# Patient Record
Sex: MALE | Race: ASIAN | NOT HISPANIC OR LATINO | URBAN - METROPOLITAN AREA
[De-identification: names, ages, dates, MRNs, and addresses within clinical notes are randomized per-mention and may not be internally consistent; named-entity substitution may affect disease eponyms.]

---

## 2018-10-02 PROBLEM — 74474003 GASTROINTESTINAL HEMORRHAGE: Status: ACTIVE | Noted: 2018-10-02

## 2019-01-21 PROBLEM — 80426004 PROLAPSED INTERNAL HEMORRHOIDS: Status: ACTIVE | Noted: 2019-01-21

## 2019-01-21 PROBLEM — 79922009 EPIGASTRIC PAIN: Status: ACTIVE | Noted: 2018-10-02

## 2019-01-21 PROBLEM — 266464001 HEMORRHAGE OF RECTUM AND ANUS: Status: ACTIVE | Noted: 2018-10-02

## 2021-02-01 ENCOUNTER — TELEPHONE ENCOUNTER (OUTPATIENT)
Dept: URBAN - METROPOLITAN AREA CLINIC 35 | Facility: CLINIC | Age: 41
End: 2021-02-01

## 2021-02-01 RX ORDER — LANSOPRAZOLE 15 MG/1
1 CAPSULE BEFORE A MEAL CAPSULE, DELAYED RELEASE ORAL
Qty: 90 | Refills: 4 | OUTPATIENT
Start: 2021-02-01

## 2021-10-21 ENCOUNTER — LAB OUTSIDE AN ENCOUNTER (OUTPATIENT)
Dept: URBAN - METROPOLITAN AREA CLINIC 35 | Facility: CLINIC | Age: 41
End: 2021-10-21

## 2021-10-21 ENCOUNTER — TELEPHONE ENCOUNTER (OUTPATIENT)
Dept: URBAN - METROPOLITAN AREA CLINIC 35 | Facility: CLINIC | Age: 41
End: 2021-10-21

## 2021-10-21 PROBLEM — 196731005 GASTRODUODENITIS: Status: ACTIVE | Noted: 2019-01-21

## 2021-11-10 ENCOUNTER — OFFICE VISIT (OUTPATIENT)
Dept: URBAN - METROPOLITAN AREA SURGERY CENTER 8 | Facility: SURGERY CENTER | Age: 41
End: 2021-11-10

## 2021-11-16 ENCOUNTER — TELEPHONE ENCOUNTER (OUTPATIENT)
Dept: URBAN - METROPOLITAN AREA CLINIC 35 | Facility: CLINIC | Age: 41
End: 2021-11-16

## 2021-11-16 RX ORDER — LANSOPRAZOLE 15 MG/1
1 CAPSULE BEFORE A MEAL TABLET, ORALLY DISINTEGRATING, DELAYED RELEASE ORAL ONCE A DAY
Qty: 90 CAPSULE | Refills: 3 | OUTPATIENT
Start: 2021-11-16

## 2022-02-12 ENCOUNTER — ERX REFILL RESPONSE (OUTPATIENT)
Dept: URBAN - METROPOLITAN AREA CLINIC 37 | Facility: CLINIC | Age: 42
End: 2022-02-12

## 2022-02-12 RX ORDER — LANSOPRAZOLE 15 MG/1
1 CAPSULE BEFORE A MEAL CAPSULE, DELAYED RELEASE ORAL
Qty: 90 | Refills: 4 | OUTPATIENT

## 2022-02-12 RX ORDER — LANSOPRAZOLE 15 MG/1
TAKE 1 CAPSULE BY MOUTH 30 MINUTES BEFORE BREAKFAST CAPSULE, DELAYED RELEASE ORAL
Qty: 90 CAPSULE | Refills: 5 | OUTPATIENT

## 2023-03-07 ENCOUNTER — ERX REFILL RESPONSE (OUTPATIENT)
Dept: URBAN - METROPOLITAN AREA CLINIC 37 | Facility: CLINIC | Age: 43
End: 2023-03-07

## 2023-03-07 RX ORDER — LANSOPRAZOLE 15 MG/1
1 CAPSULE BEFORE A MEAL TABLET, ORALLY DISINTEGRATING, DELAYED RELEASE ORAL ONCE A DAY
Qty: 90 CAPSULE | Refills: 3 | OUTPATIENT

## 2023-03-07 RX ORDER — LANSOPRAZOLE 15 MG/1
TAKE 1 CAPSULE BY MOUTH DAILY 30 MINUTES BEFORE BREAKFAST CAPSULE, DELAYED RELEASE ORAL
Qty: 90 CAPSULE | Refills: 4 | OUTPATIENT

## 2025-03-27 ENCOUNTER — TELEPHONE ENCOUNTER (OUTPATIENT)
Dept: URBAN - METROPOLITAN AREA CLINIC 35 | Facility: CLINIC | Age: 45
End: 2025-03-27

## 2025-03-31 PROBLEM — 40739000: Status: ACTIVE | Noted: 2025-03-31

## 2025-04-02 ENCOUNTER — DASHBOARD ENCOUNTERS (OUTPATIENT)
Age: 45
End: 2025-04-02

## 2025-04-02 ENCOUNTER — OFFICE VISIT (OUTPATIENT)
Dept: URBAN - METROPOLITAN AREA CLINIC 35 | Facility: CLINIC | Age: 45
End: 2025-04-02
Payer: COMMERCIAL

## 2025-04-02 ENCOUNTER — LAB OUTSIDE AN ENCOUNTER (OUTPATIENT)
Dept: URBAN - METROPOLITAN AREA CLINIC 35 | Facility: CLINIC | Age: 45
End: 2025-04-02

## 2025-04-02 DIAGNOSIS — R13.19 OTHER DYSPHAGIA: ICD-10-CM

## 2025-04-02 DIAGNOSIS — K21.9 GASTROESOPHAGEAL REFLUX DISEASE WITHOUT ESOPHAGITIS: ICD-10-CM

## 2025-04-02 PROBLEM — 266435005: Status: ACTIVE | Noted: 2025-04-02

## 2025-04-02 PROCEDURE — 99203 OFFICE O/P NEW LOW 30 MIN: CPT | Performed by: NURSE PRACTITIONER

## 2025-04-02 NOTE — PHYSICAL EXAM NEUROLOGIC:
"Patient Education     Atrial fibrillation   The Basics   Written by the doctors and editors at [facility]   What is atrial fibrillation? -- Atrial fibrillation is the most common heart rhythm problem (figure 1). The condition can put you at risk of stroke and other problems, as well as death. Atrial fibrillation is sometimes called \"A-fib.\"  The top 2 chambers of your heart are called the \"atria.\" They pump blood into the larger bottom chambers, which pump blood to your lungs and the rest of your body. In A-fib, your heart beats abnormally and the top chambers stop pumping blood as strongly as normal.  In some people, A-fib never goes away. In others, A-fib can come and go, even with treatment. If you had A-fib in the past, but have a normal heart rhythm now, ask your doctor what you can do to keep A-fib from coming back.  You might be able to lower your chances of having A-fib again if you:   Control your blood pressure   Avoid or limit alcohol   Cut down on caffeine   Get treatment for an overactive thyroid gland   Get regular exercise   Lose weight (if you are overweight)   Reduce stress  What are the symptoms of A-fib? -- Some people with A-fib have no symptoms. When symptoms do happen, they can include:   Feeling as though your heart is racing, skipping beats, or beating out of sync   Mild chest \"tightness\" or pain   Feeling lightheaded, dizzy, or like you might pass out   Having trouble breathing, especially with exercise  Is there a test for A-fib? -- Yes. If your doctor or nurse thinks that you might have A-fib, they will probably do a test called an electrocardiogram (\"ECG\"). It records the electrical activity in your heart.  How is A-fib treated? -- In some cases, A-fib goes away on its own, even without treatment. But many people do need treatment.  Treatment can include 1 or more of the following:   Medicines to control the speed or rhythm of the heartbeat   Medicines to keep clots from forming   " oriented to person, place and time , normal sensation , short and long term memory intact , oriented to person, place and time "\"Cardioversion\" - This involves applying an electrical current to the heart to fix its rhythm.   \"Ablation\" - These treatments involve destroying the small part of the heart that is sending abnormal electrical signals. This can be done using heat (\"radiofrequency ablation\") or cold (\"cryoablation\").   Pacemaker - This is a device that is implanted in the body and sends electrical signals to the heart to control the heartbeat.  What will my life be like? -- Most people with A-fib are able to live normal lives. Still, it is important to take the medicines that your doctor prescribes every day. Taking your medicines as directed can help reduce the chances that your A-fib will cause a stroke. It's also a good idea to learn what the signs and symptoms of a stroke are (figure 2).  When should I call the doctor? -- Call for an ambulance (in the [place] and [place], call 9-1-1) if:   You have severe trouble breathing, or you pass out.   You have signs of a heart attack, which might include:   Severe chest pain, pressure, or discomfort with:   Breathing trouble, sweating, upset stomach, or cold and clammy skin   Pain in your arms, back, or jaw   Pain that gets worse with activity like walking up stairs   You have signs of a stroke, which might include:   Numbness or weakness of the face, arm, or leg, especially on 1 side of the body   Confusion, or trouble speaking or understanding   Trouble seeing in 1 or both eyes   Trouble walking, dizziness, or loss of balance or coordination   Severe headache with no known cause  Call your doctor for advice if:   You have trouble breathing when talking or sitting still.   You feel your heart racing, and it does not stop after a while (for example, 1 hour).   You are lightheaded or more tired than normal.  All topics are updated as new evidence becomes available and our peer review process is complete.  This topic retrieved from [employer] on: Feb 26, 2024.  Topic 15865 Version 25.0  Release: " "32.2.4 - C32.56  © 2024 [employer] and/or its affiliates. All rights reserved.  figure 1: Atrial fibrillation     This drawing shows where the heart is located in the chest. In atrial fibrillation (\"A-fib\"), the electrical signals that control the heartbeat are abnormal. As a result, the top 2 chambers of the heart (see arrows) stop pumping effectively, and blood that should move out of these chambers gets left behind.  Graphic 67584 Version 6.0  figure 2: BE FAST to help remember stroke symptoms     One way to help remember stroke symptoms is to think of the words \"BE FAST.\" If a person shows any of these signs, call for an ambulance right away (in the [place] and [place], call 9-1-1).  Graphic 82967 Version 10.0  Consumer Information Use and Disclaimer   Disclaimer: This generalized information is a limited summary of diagnosis, treatment, and/or medication information. It is not meant to be comprehensive and should be used as a tool to help the user understand and/or assess potential diagnostic and treatment options. It does NOT include all information about conditions, treatments, medications, side effects, or risks that may apply to a specific patient. It is not intended to be medical advice or a substitute for the medical advice, diagnosis, or treatment of a health care provider based on the health care provider's examination and assessment of a patient's specific and unique circumstances. Patients must speak with a health care provider for complete information about their health, medical questions, and treatment options, including any risks or benefits regarding use of medications. This information does not endorse any treatments or medications as safe, effective, or approved for treating a specific patient. [employer] and its affiliates disclaim any warranty or liability relating to this information or the use thereof.The use of this information is governed by the Terms of Use, available at " https://www.[employer].com/en/know/clinical-effectiveness-terms. 2024© [employer] and its affiliates and/or licensors. All rights reserved.  Copyright   © 2024 [employer] and/or its affiliates. All rights reserved.

## 2025-04-02 NOTE — PHYSICAL EXAM EYES:
Conjunctivae and eyelids appear normal,  Sclerae : White without injection , Conjuntivae and eyelids appear normal , Sclerae : White without injection